# Patient Record
Sex: MALE | Race: WHITE | NOT HISPANIC OR LATINO | Employment: STUDENT | URBAN - METROPOLITAN AREA
[De-identification: names, ages, dates, MRNs, and addresses within clinical notes are randomized per-mention and may not be internally consistent; named-entity substitution may affect disease eponyms.]

---
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
## 2023-04-21 ENCOUNTER — OFFICE VISIT (OUTPATIENT)
Dept: FAMILY MEDICINE | Facility: OTHER | Age: 18
End: 2023-04-21
Attending: PHYSICIAN ASSISTANT
Payer: COMMERCIAL

## 2023-04-21 VITALS
WEIGHT: 172 LBS | HEART RATE: 108 BPM | BODY MASS INDEX: 23.3 KG/M2 | HEIGHT: 72 IN | DIASTOLIC BLOOD PRESSURE: 84 MMHG | TEMPERATURE: 98.2 F | SYSTOLIC BLOOD PRESSURE: 128 MMHG | RESPIRATION RATE: 16 BRPM | OXYGEN SATURATION: 98 %

## 2023-04-21 DIAGNOSIS — Z00.00 ROUTINE GENERAL MEDICAL EXAMINATION AT A HEALTH CARE FACILITY: ICD-10-CM

## 2023-04-21 DIAGNOSIS — Z71.6 ENCOUNTER FOR TOBACCO USE CESSATION COUNSELING: ICD-10-CM

## 2023-04-21 DIAGNOSIS — F19.10 DRUG ABUSE (H): ICD-10-CM

## 2023-04-21 DIAGNOSIS — G44.209 TENSION HEADACHE: Primary | ICD-10-CM

## 2023-04-21 PROCEDURE — 99213 OFFICE O/P EST LOW 20 MIN: CPT | Mod: 25 | Performed by: PHYSICIAN ASSISTANT

## 2023-04-21 PROCEDURE — 99395 PREV VISIT EST AGE 18-39: CPT | Performed by: PHYSICIAN ASSISTANT

## 2023-04-21 RX ORDER — GUANFACINE 3 MG/1
1 TABLET, EXTENDED RELEASE ORAL
COMMUNITY
Start: 2023-04-01 | End: 2023-05-10

## 2023-04-21 RX ORDER — LAMOTRIGINE 200 MG/1
1 TABLET ORAL
COMMUNITY
Start: 2023-04-16

## 2023-04-21 RX ORDER — FLUOXETINE 40 MG/1
40 CAPSULE ORAL DAILY
COMMUNITY
Start: 2023-04-07

## 2023-04-21 RX ORDER — IBUPROFEN 800 MG/1
800 TABLET, FILM COATED ORAL EVERY 8 HOURS PRN
Qty: 30 TABLET | Refills: 0 | Status: SHIPPED | OUTPATIENT
Start: 2023-04-21

## 2023-04-21 ASSESSMENT — ANXIETY QUESTIONNAIRES
3. WORRYING TOO MUCH ABOUT DIFFERENT THINGS: SEVERAL DAYS
4. TROUBLE RELAXING: SEVERAL DAYS
GAD7 TOTAL SCORE: 9
1. FEELING NERVOUS, ANXIOUS, OR ON EDGE: NEARLY EVERY DAY
GAD7 TOTAL SCORE: 9
7. FEELING AFRAID AS IF SOMETHING AWFUL MIGHT HAPPEN: NOT AT ALL
2. NOT BEING ABLE TO STOP OR CONTROL WORRYING: NEARLY EVERY DAY
8. IF YOU CHECKED OFF ANY PROBLEMS, HOW DIFFICULT HAVE THESE MADE IT FOR YOU TO DO YOUR WORK, TAKE CARE OF THINGS AT HOME, OR GET ALONG WITH OTHER PEOPLE?: SOMEWHAT DIFFICULT
IF YOU CHECKED OFF ANY PROBLEMS ON THIS QUESTIONNAIRE, HOW DIFFICULT HAVE THESE PROBLEMS MADE IT FOR YOU TO DO YOUR WORK, TAKE CARE OF THINGS AT HOME, OR GET ALONG WITH OTHER PEOPLE: SOMEWHAT DIFFICULT
7. FEELING AFRAID AS IF SOMETHING AWFUL MIGHT HAPPEN: NOT AT ALL
6. BECOMING EASILY ANNOYED OR IRRITABLE: NOT AT ALL
5. BEING SO RESTLESS THAT IT IS HARD TO SIT STILL: SEVERAL DAYS

## 2023-04-21 ASSESSMENT — PAIN SCALES - GENERAL: PAINLEVEL: NO PAIN (0)

## 2023-04-21 NOTE — PROGRESS NOTES
SUBJECTIVE:   CC: Jc is an 17 year old who presents for preventative health visit.     Patient has been advised of split billing requirements and indicates understanding: Yes  Healthy Habits:     Taking medications regularly:  0    PHQ-2 Total Score: 1  History of Present Illness       Reason for visit:  Treatment    He eats 2-3 servings of fruits and vegetables daily.He consumes 3 sweetened beverage(s) daily.He exercises with enough effort to increase his heart rate 30 to 60 minutes per day.  He exercises with enough effort to increase his heart rate 3 or less days per week.   He is taking medications regularly.  Today's CELESTE-7 Score: 9    Patient presents today for intake physical for [facility], he arrived yesterday. Prior to this he was incarcerated for 20 days and was withdrawing from [facility] - last use was prior to intoxication, social alcohol use (last drink was on New Years), tobacco use 93-4 cigarettes daily). He states he is also at [facility]. He is eager to be on the path to recovery, he will be spending 30 days in high intensity detox and will be transitioning either back home.)  Or into a lower level, he is unsure of his plans at this time.     He does state that due to the withdrawal process he has been having headaches early to mid afternoon on a daily basis but started naproxen 3 to 4 weeks ago.  He denies any vision changes, neurologic changes and or other pertinent symptomatology.  He is requesting a prescription for ibuprofen to take on an as-needed basis for headaches.  He has no auras, no light or sound sensitivity.  Minimal caffeine intake. No additional symptoms to report.     He tested negative for tuberculosis while incarcerated per report.  He would like to decline any blood work and immunizations today.     Declines chest pain, shortness of breath, URI, GI/ concerns or any other pertinent symptomatology.    Today's PHQ-2 Score:       4/21/2023     7:56 AM    PHQ-2 ( 1999 [employer])   Q1: Little interest or pleasure in doing things 0   Q2: Feeling down, depressed or hopeless 1   PHQ-2 Score 1   Q1: Little interest or pleasure in doing things Not at all   Q2: Feeling down, depressed or hopeless Several days   PHQ-2 Score 1     Have you ever done Advance Care Planning? (For example, a Health Directive, POLST, or a discussion with a medical provider or your loved ones about your wishes): No, advance care planning information given to patient to review.  Patient declined advance care planning discussion at this time.    Social History     Tobacco Use     Smoking status: Every Day     Packs/day: 0.25     Types: Cigarettes     Smokeless tobacco: Never     Tobacco comments:     NO PASSIVE SMOKE EXPOSURE   Vaping Use     Vaping status: Former   Substance Use Topics     Alcohol use: Not Currently              View : No data to display.                   View : No data to display.                Last PSA: No results found for: PSA    Reviewed orders with patient. Reviewed health maintenance and updated orders accordingly - Yes  BP Readings from Last 3 Encounters:   04/21/23 128/84   12/14/20 122/68 (61 %, Z = 0.28 /  43 %, Z = -0.18)*   01/22/20 92/54 (<1 %, Z <-2.33 /  9 %, Z = -1.34)*     *BP percentiles are based on the 2017 AAP Clinical Practice Guideline for boys    Wt Readings from Last 3 Encounters:   04/21/23 78 kg (172 lb) (73 %, Z= 0.60)*   12/14/20 88.2 kg (194 lb 6.4 oz) (94 %, Z= 1.52)*   01/22/20 86.6 kg (191 lb) (95 %, Z= 1.61)*     * Growth percentiles are based on [facility] (Boys, 2-20 Years) data.                  Patient Active Problem List   Diagnosis     ADHD (attention deficit hyperactivity disorder)     Simple tics     Anxiety state     Past Surgical History:   Procedure Laterality Date     ARTHROSCOPIC RECONSTRUCTION ANTERIOR CRUCIATE LIGAMENT Left 12/2020    ACL reconstruction - cadaver done; menisicus tear     GENITOURINARY SURGERY      circ       Social History      Tobacco Use     Smoking status: Every Day     Packs/day: 0.25     Types: Cigarettes     Smokeless tobacco: Never     Tobacco comments:     NO PASSIVE SMOKE EXPOSURE   Vaping Use     Vaping status: Former   Substance Use Topics     Alcohol use: Not Currently     Family History   Problem Relation Age of Onset     Allergies Mother      Allergies Father          Current Outpatient Medications   Medication Sig Dispense Refill     FLUoxetine (PROZAC) 40 MG capsule Take 40 mg by mouth daily       guanFACINE HCl (INTUNIV) 3 MG TB24 24 hr tablet Take 1 tablet by mouth daily at 2 pm       ibuprofen (ADVIL/MOTRIN) 800 MG tablet Take 1 tablet (800 mg) by mouth every 8 hours as needed for moderate pain 30 tablet 0     lamoTRIgine (LAMICTAL) 200 MG tablet Take 1 tablet by mouth daily at 2 pm       Allergies   Allergen Reactions     Amoxicillin Rash       Reviewed and updated as needed this visit by clinical staff   Tobacco  Allergies  Meds      Soc Hx        Reviewed and updated as needed this visit by Provider                 Past Medical History:   Diagnosis Date     ADHD (attention deficit hyperactivity disorder) 8/30/2013     Anxiety state, unspecified      Simple tics       Past Surgical History:   Procedure Laterality Date     ARTHROSCOPIC RECONSTRUCTION ANTERIOR CRUCIATE LIGAMENT Left 12/2020    ACL reconstruction - cadaver done; menisicus tear     GENITOURINARY SURGERY      circ       Review of Systems  CONSTITUTIONAL: NEGATIVE for fever, chills, change in weight  INTEGUMENTARY/SKIN: NEGATIVE for worrisome rashes, moles or lesions  EYES: NEGATIVE for vision changes or irritation  ENT: NEGATIVE for ear, mouth and throat problems  RESP: NEGATIVE for significant cough or SOB  CV: NEGATIVE for chest pain, palpitations or peripheral edema  GI: NEGATIVE for nausea, abdominal pain, heartburn, or change in bowel habits   male: negative for dysuria, hematuria, decreased urinary stream, erectile dysfunction, urethral  "discharge  MUSCULOSKELETAL: NEGATIVE for significant arthralgias or myalgia  NEURO: NEGATIVE for weakness, dizziness or paresthesias  PSYCHIATRIC: NEGATIVE for changes in mood or affect    OBJECTIVE:   /84   Pulse 108   Temp 98.2  F (36.8  C)   Resp 16   Ht 1.822 m (5' 11.75\")   Wt 78 kg (172 lb)   SpO2 98%   BMI 23.49 kg/m      Physical Exam  GENERAL: healthy, alert and no distress  EYES: Eyes grossly normal to inspection, PERRL and conjunctivae and sclerae normal  HENT: ear canals and TM's normal, nose and mouth without ulcers or lesions  NECK: no adenopathy, no asymmetry, masses, or scars and thyroid normal to palpation  RESP: lungs clear to auscultation - no rales, rhonchi or wheezes  CV: regular rate and rhythm, normal S1 S2, no S3 or S4, no murmur, click or rub, no peripheral edema and peripheral pulses strong  ABDOMEN: soft, nontender, no hepatosplenomegaly, no masses and bowel sounds normal  MS: no gross musculoskeletal defects noted, no edema  SKIN: no suspicious lesions or rashes  NEURO: Normal strength and tone, mentation intact and speech normal  BACK: no CVA tenderness, no paralumbar tenderness  PSYCH: mentation appears normal and anxious  LYMPH: normal ant/post cervical, supraclavicular nodes    Diagnostic Test Results:  none     ASSESSMENT/PLAN:   1. Routine general medical examination at a health care facility  -Vital signs are stable with blood pressure of 128/84, pulse of 108.  Patient is pleasant and appears in good health.  Physical examination is available for review above without any acute abnormalities.  Medication list was reviewed and updated with patient.  He states he is up-to-date on all refills but will reach out if anything changes.    2. Drug abuse (H)  -Last use was 21 days ago for Kratom and 1/1/23 for alcohol. Congratulated on progress thus far and entrance to detox. Discussed path to recovery, etc.     3. Encounter for tobacco use cessation counseling  - Kindly not " "interested at this time, we discussed resources and he will reach out if interested.     4. Tension headache  - ibuprofen (ADVIL/MOTRIN) 800 MG tablet; Take 1 tablet (800 mg) by mouth every 8 hours as needed for moderate pain  Dispense: 30 tablet; Refill: 0  - Stable headaches without any alarm symptoms. Script for Ibuprofen 800 mg, he is able to take 1 tablet up to every 8 hours if needed for headache was written. If patient develops any of the following (or other concerning symptoms), they is agreeable to return for reevaluation: \"worst headache of my life/thunderclap headache\", fever, neck stiffness, seizure, confusion, LOC, post injury, etc. Recommend increased water intake, regular exercise and sleep cycle, regular meal times (as possible), avoid migraine triggers (certain food dyes, alcohol, wine, caffeine in high concentration, odors/perfumes, stress). OTC tylenol and ibuprofen as needed. Patient is in agreement and understanding of the above treatment plan. All questions and concerns were addressed and answered to patient's satisfaction. AVS reviewed with patient.     Patient has been advised of split billing requirements and indicates understanding: Yes    COUNSELING:   Reviewed preventive health counseling, as reflected in patient instructions    He reports that he has been smoking cigarettes. He has been smoking an average of .25 packs per day. He has never used smokeless tobacco.  Nicotine/Tobacco Cessation Plan:   Information offered: Patient not interested at this time            Patti Garcia PA-C  [facility] AND [facility]  "

## 2023-04-21 NOTE — NURSING NOTE
Patient presents today for intake physical for [facility].    Medication Reconciliation Complete    Laura Mathur LPN  4/21/2023 8:02 AM

## 2023-05-10 ENCOUNTER — OFFICE VISIT (OUTPATIENT)
Dept: FAMILY MEDICINE | Facility: OTHER | Age: 18
End: 2023-05-10
Attending: PHYSICIAN ASSISTANT
Payer: COMMERCIAL

## 2023-05-10 VITALS
SYSTOLIC BLOOD PRESSURE: 120 MMHG | RESPIRATION RATE: 18 BRPM | HEART RATE: 96 BPM | HEIGHT: 72 IN | TEMPERATURE: 97.5 F | DIASTOLIC BLOOD PRESSURE: 74 MMHG | OXYGEN SATURATION: 99 % | BODY MASS INDEX: 24.72 KG/M2 | WEIGHT: 182.5 LBS

## 2023-05-10 DIAGNOSIS — B07.8 COMMON WART: ICD-10-CM

## 2023-05-10 DIAGNOSIS — Z11.3 SCREEN FOR STD (SEXUALLY TRANSMITTED DISEASE): Primary | ICD-10-CM

## 2023-05-10 DIAGNOSIS — A74.9 CHLAMYDIA: ICD-10-CM

## 2023-05-10 LAB
C TRACH DNA SPEC QL PROBE+SIG AMP: POSITIVE
N GONORRHOEA DNA SPEC QL NAA+PROBE: NEGATIVE

## 2023-05-10 PROCEDURE — 99213 OFFICE O/P EST LOW 20 MIN: CPT | Mod: 25 | Performed by: PHYSICIAN ASSISTANT

## 2023-05-10 PROCEDURE — 87591 N.GONORRHOEAE DNA AMP PROB: CPT | Mod: ZL | Performed by: PHYSICIAN ASSISTANT

## 2023-05-10 PROCEDURE — 87491 CHLMYD TRACH DNA AMP PROBE: CPT | Mod: ZL | Performed by: PHYSICIAN ASSISTANT

## 2023-05-10 PROCEDURE — 17110 DESTRUCTION B9 LES UP TO 14: CPT | Performed by: PHYSICIAN ASSISTANT

## 2023-05-10 RX ORDER — DOXYCYCLINE 100 MG/1
100 CAPSULE ORAL 2 TIMES DAILY
Qty: 14 CAPSULE | Refills: 0 | Status: SHIPPED | OUTPATIENT
Start: 2023-05-10 | End: 2023-05-10

## 2023-05-10 RX ORDER — ATOMOXETINE 25 MG/1
25 CAPSULE ORAL EVERY MORNING
COMMUNITY
Start: 2023-05-08

## 2023-05-10 RX ORDER — DOXYCYCLINE 100 MG/1
100 CAPSULE ORAL 2 TIMES DAILY
Qty: 14 CAPSULE | Refills: 0 | Status: SHIPPED | OUTPATIENT
Start: 2023-05-10

## 2023-05-10 ASSESSMENT — ANXIETY QUESTIONNAIRES
7. FEELING AFRAID AS IF SOMETHING AWFUL MIGHT HAPPEN: NOT AT ALL
5. BEING SO RESTLESS THAT IT IS HARD TO SIT STILL: SEVERAL DAYS
2. NOT BEING ABLE TO STOP OR CONTROL WORRYING: MORE THAN HALF THE DAYS
GAD7 TOTAL SCORE: 8
IF YOU CHECKED OFF ANY PROBLEMS ON THIS QUESTIONNAIRE, HOW DIFFICULT HAVE THESE PROBLEMS MADE IT FOR YOU TO DO YOUR WORK, TAKE CARE OF THINGS AT HOME, OR GET ALONG WITH OTHER PEOPLE: SOMEWHAT DIFFICULT
6. BECOMING EASILY ANNOYED OR IRRITABLE: NOT AT ALL
3. WORRYING TOO MUCH ABOUT DIFFERENT THINGS: SEVERAL DAYS
1. FEELING NERVOUS, ANXIOUS, OR ON EDGE: NEARLY EVERY DAY
GAD7 TOTAL SCORE: 8
4. TROUBLE RELAXING: SEVERAL DAYS
8. IF YOU CHECKED OFF ANY PROBLEMS, HOW DIFFICULT HAVE THESE MADE IT FOR YOU TO DO YOUR WORK, TAKE CARE OF THINGS AT HOME, OR GET ALONG WITH OTHER PEOPLE?: SOMEWHAT DIFFICULT
7. FEELING AFRAID AS IF SOMETHING AWFUL MIGHT HAPPEN: NOT AT ALL

## 2023-05-10 ASSESSMENT — PAIN SCALES - GENERAL: PAINLEVEL: NO PAIN (0)

## 2023-05-10 NOTE — PROGRESS NOTES
Assessment & Plan     1. Screen for STD (sexually transmitted disease)  - GC/Chlamydia by PCR  - Trichomonas vaginalis by PCR  - Patient encounter for STD testing.  Discussed that results depending on labs ordered will vary in return time from 1 to 5 days (on average).  Patient will be informed of results as soon as available and updated on appropriate treatment plan.  Safe sex practices were reviewed during visit today.  If patient has any changing or worsening symptoms such as abdominal pain, genitalia concerns/discharge or other concerning symptoms they are agreeable to return for reevaluation. Patient is in agreement and understanding of the above treatment plan. All questions and concerns were addressed and answered to patient's satisfaction. AVS reviewed with patient.     2. Common wart  - Treat Benign Wart or Mulloscum Contagiosum or Milia up to 14 lesions (No quantity required)  -Refer to procedure note below.  He was scheduled with a 2-week follow-up for repeat assessment and cryotherapy.    3. Chlamydia  - doxycycline hyclate (VIBRAMYCIN) 100 MG capsule; Take 1 capsule (100 mg) by mouth 2 times daily for 7 days  Dispense: 14 capsule; Refill: 0   -GC returning positive for chlamydia.  We will treat with doxycycline 100 mg by mouth twice daily for 7 days.  Avoid sexual intercourse during entire course of treatment.  Recommend treatment for all sexual partners.  Recommend avoid daily multivitamin while doxycycline.    See Patient Instructions    Return in about 2 weeks (around 5/24/2023).    Patti Garcia PA-C  [facility] AND [facility]    Amol Greene is a 17 year old, presenting for the following health issues:  STD (Unprotected intercourse) and Derm Problem (Left hand wart on thumb)         View : No data to display.              STD    History of Present Illness       Reason for visit:  Wart removal and bloodwork    He eats 2-3 servings of fruits and vegetables daily.He consumes 2  "sweetened beverage(s) daily.He exercises with enough effort to increase his heart rate 30 to 60 minutes per day.  He exercises with enough effort to increase his heart rate 4 days per week.   He is taking medications regularly.  Today's CELESTE-7 Score: 8     Patient presents with concerns for STD testing.  He reports between January 2023 in March 2023 unprotected intercourse with 3 different partners.  No known exposures.  He denies any discharge, genital lesions, urinary symptoms (urgency, frequency, drainage).  No history of STDs.  He is up-to-date on vaccinations.  He has an allergy to amoxicillin.    He also would like a wart addressed.  This has been there for approximately 3 years.  He has not tried any over-the-counter treatments.    Review of Systems   Constitutional, HEENT, cardiovascular, pulmonary, GI, , musculoskeletal, neuro, skin, endocrine and psych systems are negative, except as otherwise noted.      Objective    /74 (BP Location: Left arm, Patient Position: Sitting, Cuff Size: Adult Regular)   Pulse 96   Temp 97.5  F (36.4  C) (Tympanic)   Resp 18   Ht 1.822 m (5' 11.75\")   Wt 82.8 kg (182 lb 8 oz)   SpO2 99%   BMI 24.92 kg/m    Body mass index is 24.92 kg/m .  Physical Exam   GENERAL: healthy, alert and no distress  NECK: no adenopathy, no asymmetry, masses, or scars and thyroid normal to palpation  RESP: lungs clear to auscultation - no rales, rhonchi or wheezes  CV: regular rate and rhythm, normal S1 S2, no S3 or S4, no murmur, click or rub, no peripheral edema and peripheral pulses strong  ABDOMEN: soft, nontender, no hepatosplenomegaly, no masses and bowel sounds normal  MS: no gross musculoskeletal defects noted, no edema  SKIN: 1.5 cm wart - IP joint level of left thumb  PSYCH: mentation appears normal, affect normal/bright    Each wart was frozen easily three times with liquid nitrogen.  Each wart was pared with a #15 blade.  A total of 1 warts are treated today.  The etiology " of common warts were discussed.  .name will continue to use the over-the-counter medications such as Compound W on a nightly basis.  Warm soapy water soaks and sanding also recommended.  The patient is to return every two weeks until all warts have been removed.

## 2023-05-10 NOTE — NURSING NOTE
Patient presents to clinic from [facility] requesting to be tested for STD's.  He has no symptoms and did have unprotected intercourse.  Also, wart on thumb left hand.    Medication Rec Complete  Annel Whitlock LPN............5/10/2023 12:49 PM

## 2023-05-11 ENCOUNTER — TELEPHONE (OUTPATIENT)
Dept: FAMILY MEDICINE | Facility: OTHER | Age: 18
End: 2023-05-11
Payer: COMMERCIAL

## 2023-05-24 ENCOUNTER — OFFICE VISIT (OUTPATIENT)
Dept: FAMILY MEDICINE | Facility: OTHER | Age: 18
End: 2023-05-24
Attending: PHYSICIAN ASSISTANT
Payer: COMMERCIAL

## 2023-05-24 VITALS
TEMPERATURE: 99 F | WEIGHT: 178 LBS | HEART RATE: 114 BPM | SYSTOLIC BLOOD PRESSURE: 110 MMHG | RESPIRATION RATE: 20 BRPM | BODY MASS INDEX: 24.11 KG/M2 | HEIGHT: 72 IN | OXYGEN SATURATION: 98 % | DIASTOLIC BLOOD PRESSURE: 60 MMHG

## 2023-05-24 DIAGNOSIS — B07.8 COMMON WART: Primary | ICD-10-CM

## 2023-05-24 PROCEDURE — 17110 DESTRUCTION B9 LES UP TO 14: CPT | Performed by: PHYSICIAN ASSISTANT

## 2023-05-24 ASSESSMENT — PAIN SCALES - GENERAL: PAINLEVEL: NO PAIN (0)

## 2023-05-24 NOTE — NURSING NOTE
Pt here for a f/u on his wart on left thumb.  Yesenia Rose CMA (AAMA)......................5/24/2023  12:48 PM       Medication Reconciliation: complete    Yesenia Rose CMA  5/24/2023 12:48 PM

## 2023-05-24 NOTE — PROGRESS NOTES
"  Assessment & Plan     1. Common wart  - DESTRUCT BENIGN LESION, UP TO 14  - Common wart to IP joint of left thumb treated today by paring down with #15 blade and 3 freeze-thaw cycles with liquid nitrogen. He will continue with OTC products on nightly basis. Return in 2-3 weeks as needed for repeat therapy. Patient is in agreement and understanding of the above treatment plan. All questions and concerns were addressed and answered to patient's satisfaction. AVS reviewed with patient.     No follow-ups on file.    Patti Garcia PA-C  [facility] AND \[facility]\""   Jc is a 18 year old, presenting for the following health issues:  Derm Problem (Wart left thumb)        5/24/2023    12:47 PM   Additional Questions   Roomed by Yesenia SÁNCHEZ CMA     HPI     Follow up on wart on left thumb. Seen 5/10/23, treated with liquid nitrogen, noted improvement with this. Would like to have repeat therapy today. No fevers, chills, drainage, warmth, redness or trauma to site. No OTC products.     Review of Systems   Constitutional, HEENT, cardiovascular, pulmonary, GI, , musculoskeletal, neuro, skin, endocrine and psych systems are negative, except as otherwise noted.      Objective    /60 (BP Location: Right arm, Patient Position: Sitting, Cuff Size: Adult Large)   Pulse 114   Temp 99  F (37.2  C) (Tympanic)   Resp 20   Ht 1.835 m (6' 0.25\")   Wt 80.7 kg (178 lb)   SpO2 98%   BMI 23.97 kg/m    Body mass index is 23.97 kg/m .  Physical Exam   GENERAL: healthy, alert and no distress  RESP: lungs clear to auscultation - no rales, rhonchi or wheezes  CV: regular rate and rhythm, normal S1 S2, no S3 or S4, no murmur, click or rub, no peripheral edema and peripheral pulses strong  MS: no gross musculoskeletal defects noted, no edema  SKIN: 1.2 cm wart - IP joint left thumb  PSYCH: mentation appears normal, affect normal/bright     Wart was pared down with #15 blade then 3 freeze-thaw cycles with liquid " nitrogen were performed. Continue with OTC products on nightly basis. May return in 2-3 weeks for repeat cryotherapy.

## 2024-11-26 ENCOUNTER — PATIENT OUTREACH (OUTPATIENT)
Dept: FAMILY MEDICINE | Facility: OTHER | Age: 19
End: 2024-11-26

## 2024-11-26 NOTE — TELEPHONE ENCOUNTER
Patient Quality Outreach    Patient is due for the following:   Physical Preventive Adult Physical    Action(s) Taken:   Schedule a Adult Preventative    Type of outreach:    Sent letter.    Questions for provider review:    None           Mellisa Diaz

## 2024-11-26 NOTE — LETTER
[facility]  [address]  [place] [facility] [address]  [phone]       November 26, 2024    Jc Vela  [address] [facility] [address]    Dear Jc,    We care about your health and have reviewed your health plan and are making recommendations based on this review, to optimize your health.    You are in particular need of attention regarding:  -Wellness (Physical) Visit     We are recommending that you:  -schedule a WELLNESS (Physical) APPOINTMENT with me.        In addition, here is a list of due or overdue Health Maintenance reminders.    Health Maintenance Due   Topic Date Due    Pneumococcal Vaccine (1 of 2 - PCV) 05/18/2009    HIV Screening  Never done    HPV Vaccine (1 - Male 3-dose series) Never done    Hepatitis C Screening  Never done    PHQ-2 (once per calendar year)  01/01/2024    Yearly Preventive Visit  04/21/2024    Flu Vaccine (1) 09/01/2024    COVID-19 Vaccine (1 - 2024-25 season) Never done       To address the above recommendations, we encourage you to contact us at [phone]. They will assist you with finding the most convenient time and location.    Thank you for trusting [facility] and we appreciate the opportunity to serve you.  We look forward to supporting your healthcare needs in the future.    Healthy Regards,    Your [facility] Team